# Patient Record
Sex: FEMALE | Race: WHITE | Employment: FULL TIME | URBAN - METROPOLITAN AREA
[De-identification: names, ages, dates, MRNs, and addresses within clinical notes are randomized per-mention and may not be internally consistent; named-entity substitution may affect disease eponyms.]

---

## 2021-09-10 ENCOUNTER — HOSPITAL ENCOUNTER (EMERGENCY)
Age: 31
Discharge: HOME | End: 2021-09-10
Attending: EMERGENCY MEDICINE
Payer: COMMERCIAL

## 2021-09-10 ENCOUNTER — APPOINTMENT (OUTPATIENT)
Dept: GENERAL RADIOLOGY | Age: 31
End: 2021-09-10
Payer: COMMERCIAL

## 2021-09-10 VITALS
TEMPERATURE: 98.6 F | RESPIRATION RATE: 18 BRPM | HEIGHT: 64 IN | OXYGEN SATURATION: 98 % | DIASTOLIC BLOOD PRESSURE: 77 MMHG | SYSTOLIC BLOOD PRESSURE: 122 MMHG | BODY MASS INDEX: 27.31 KG/M2 | WEIGHT: 160 LBS | HEART RATE: 87 BPM

## 2021-09-10 DIAGNOSIS — S52.601A CLOSED FRACTURE OF DISTAL ENDS OF RIGHT RADIUS AND ULNA, INITIAL ENCOUNTER: Primary | ICD-10-CM

## 2021-09-10 DIAGNOSIS — S52.501A CLOSED FRACTURE OF DISTAL ENDS OF RIGHT RADIUS AND ULNA, INITIAL ENCOUNTER: Primary | ICD-10-CM

## 2021-09-10 PROCEDURE — 73110 X-RAY EXAM OF WRIST: CPT

## 2021-09-10 PROCEDURE — 6370000000 HC RX 637 (ALT 250 FOR IP): Performed by: PHYSICIAN ASSISTANT

## 2021-09-10 PROCEDURE — 29125 APPL SHORT ARM SPLINT STATIC: CPT

## 2021-09-10 PROCEDURE — 99284 EMERGENCY DEPT VISIT MOD MDM: CPT

## 2021-09-10 RX ORDER — IBUPROFEN 600 MG/1
600 TABLET ORAL ONCE
Status: COMPLETED | OUTPATIENT
Start: 2021-09-10 | End: 2021-09-10

## 2021-09-10 RX ORDER — HYDROCODONE BITARTRATE AND ACETAMINOPHEN 5; 325 MG/1; MG/1
1 TABLET ORAL EVERY 6 HOURS PRN
Qty: 6 TABLET | Refills: 0 | Status: SHIPPED | OUTPATIENT
Start: 2021-09-10 | End: 2021-09-12

## 2021-09-10 RX ORDER — NORETHINDRONE ACETATE AND ETHINYL ESTRADIOL 1MG-20(21)
1 KIT ORAL DAILY
COMMUNITY

## 2021-09-10 RX ADMIN — IBUPROFEN 600 MG: 600 TABLET, FILM COATED ORAL at 16:18

## 2021-09-10 ASSESSMENT — PAIN DESCRIPTION - ORIENTATION: ORIENTATION: RIGHT

## 2021-09-10 ASSESSMENT — PAIN DESCRIPTION - LOCATION: LOCATION: WRIST

## 2021-09-10 ASSESSMENT — PAIN SCALES - GENERAL: PAINLEVEL_OUTOF10: 5

## 2021-09-10 ASSESSMENT — PAIN DESCRIPTION - DESCRIPTORS: DESCRIPTORS: THROBBING

## 2021-09-10 ASSESSMENT — PAIN DESCRIPTION - PROGRESSION: CLINICAL_PROGRESSION: NOT CHANGED

## 2021-09-10 NOTE — ED TRIAGE NOTES
Pt fell and onto right wrist while playing soccer with kids at work.   Pain and mild swelling, limited range of motion to right wrist.

## 2021-09-10 NOTE — ED PROVIDER NOTES
Emergency Department         COMPLAINT       Chief Complaint   Patient presents with    Wrist Injury      PHYSICAL EXAM      ED Triage Vitals [09/10/21 1551]   BP Temp Temp Source Pulse Resp SpO2 Height Weight   122/77 98.6 °F (37 °C) Temporal 87 18 98 % 5' 4\" (1.626 m) 160 lb (72.6 kg)     Constitutional: Alert, oriented x3, nontoxic, answering questions appropriately, acting properly for age, in no acute distress   HEENT: Extraocular muscles intact,   Neck: Trachea midline, no posterior midline neck tenderness palpation  Musculoskeletal: Right upper extremity no pain to the shoulder or elbow. There is tenderness to the dorsum of the wrist with mild swelling. No deformity. Radial ulnar arteries intact and capillary refill less than 2 seconds. Neurologic: Right upper extremity motor and sensory intact. Skin: Warm and dry     Physical Exam  DIAGNOSTIC RESULTS     EKG: All EKG's are interpreted by the Emergency Department Physician who either signs or Co-signs this chart in the absence of a cardiologist.    Not indicated unless otherwise documented above or in the midlevel documentation    LABS:  No results found for this visit on 09/10/21. Not indicated unless otherwise documented above or in the midlevel documentation    RADIOLOGY:   I reviewedthe radiologist interpretations:  XR WRIST RIGHT (MIN 3 VIEWS)   Final Result   Distal radial and ulnar fractures as described. Not indicated unless otherwise documented above or in the midlevel documentation    EMERGENCY DEPARTMENT COURSE:       PERTINENT ATTENDING PHYSICIAN COMMENTS:    Fall on outstretched right wrist.  X-ray shows fracture of the distal radius and ulna. Will place in the splint and have her follow-up with orthopedics. Faculty Attestation    I performed a history and physical examination of the patient and discussed management with the mid level provideer.  I reviewed the mid level provider's note and agree with the

## 2021-09-10 NOTE — ED NOTES
LUIS Mireles at bedside to apply splint to right wrist/forearm     Vitaly Ruggiero RN  09/10/21 6218

## 2021-09-10 NOTE — ED PROVIDER NOTES
[address] [facility]  [address] \[facility] [place] [facility]\"" [address]  Phone: [phone]  Fax: [phone]        Pt Name: Anthony Jeronimo  MRN: [id number]  [place] 1990  Date of evaluation: 9/10/21    CHIEFCOMPLAINT       Chief Complaint   Patient presents with    Wrist Injury       HISTORY OF PRESENT ILLNESS (Location/Symptom, Timing/Onset, Context/Setting, Quality, Duration, Modifying Factors, Severity)      Anthony Jeronimo is a 26 y.o. female with no pertinent PMH who presents to the ED via private auto with right wrist pain. 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 This occurred just prior to arrival.  Denies history of previous fractures. Patient has exacerbation of the pain with movement and improves with rest. Denies any fever, chills, numbness, weakness, paresthesias, rash, abrasions, lacerations, bleeding disorders or use of anticoagulation, pain to the surrounding joints, any other injuries, or any other concerns at this time. PAST MEDICAL / SURGICAL / SOCIAL / FAMILY HISTORY     PMH:  has no past medical history on file. Surgical History:  has no past surgical history on file. Social History:  reports that she has never smoked. She has never used smokeless tobacco. She reports that she does not drink alcohol and does not use drugs. Family History: has no family status information on file. family history is not on file. Psychiatric History: None    Allergies: Patient has no known allergies. Home Medications:   Prior to Admission medications    Medication Sig Start Date End Date Taking?  Authorizing Provider   norethindrone-ethinyl estradiol (JUNEL FE 1/20) 1-20 MG-MCG per tablet Take 1 tablet by mouth daily   Yes Historical Provider, MD   HYDROcodone-acetaminophen (Ruby Lock) 5-325 MG per tablet Take 1 tablet by mouth every 6 hours as needed for Pain for up to 2 days. Intended supply: 3 days. Take lowest dose possible to manage pain 9/10/21 9/12/21 Yes Aline Nelson PA-C       REVIEW OF SYSTEMS  (2-9 systems for level 4, 10 ormore for level 5)      Review of Systems    See HPI. PHYSICAL EXAM  (up to 7 for level 4, 8 or more for level 5)      INITIAL VITALS:  height is 5' 4\" (1.626 m) and weight is 72.6 kg (160 lb). Her temporal temperature is 98.6 °F (37 °C). Her blood pressure is 122/77 and her pulse is 87. Her respiration is 18 and oxygen saturation is 98%. Vital signs reviewed. Physical Exam    General:  Alert, cooperative, well-groomed, well-nourished, appears stated age, and is in no acute distress. Head:  Normocephalic, atraumatic, and without obvious abnormality. Eyes:  Sclerae/conjunctivae clear without injection, pallor, or icterus. Corneas clear without opacities. EOM's intact. ENT: Ears and nose are all without obvious masses lesion or deformity. No oropharynx examination performed due to aerosolization risk during COVID-19 pandemic. Neck: Supple and symmetrical. Trachea midline. No adenopathy. Musculoskeletal: The right hand/wrist appears edematous and developing ecchymosis over the radial aspect and is diffusely tender to palpation along both the radial and ulnar aspect of the wrist.  No erythema, warmth, abrasions, or lacerations to the area. No snuffbox tenderness. Full ROM of the digits.  strength decreased secondary to pain. Sensation intact to light touch. The shoulder and elbow joints are normal in appearance with full ROM and 5/5 strength. Radial pulses 2+ and symmetrical. Cap refill <2 seconds. Extremities: Warm and dry without erythema or edema. No venous stasis changes. Skin: Soft, good turgor, and well-hydrated. No obvious rashes or lesions. Neurologic: GCS is 15 and no focal deficits are appreciated. Normal gait.  Grossly normal motor and sensation. Speech clear. Psychiatric: Normal mood and affect. Normal behavior. Coherent thought process. DIFFERENTIAL DIAGNOSIS / MDM     The patient presented to the ED with right wrist pain with a mechanism suggestive of a fracture versus sprain/strain. Vital signs are stable. The shoulder and elbow joints were not affected. There are no lesions, lacerations, or signs of compartment syndrome. Motor is decreased secondary to pain. Pulses are 2+ and sensation is intact. Will obtain an XR and give ice and ibuprofen for pain. PLAN (LABS / IMAGING / EKG):  Orders Placed This Encounter   Procedures    XR WRIST RIGHT (MIN 3 VIEWS)       MEDICATIONS ORDERED:  Orders Placed This Encounter   Medications    ibuprofen (ADVIL;MOTRIN) tablet 600 mg    HYDROcodone-acetaminophen (NORCO) 5-325 MG per tablet     Sig: Take 1 tablet by mouth every 6 hours as needed for Pain for up to 2 days. Intended supply: 3 days. Take lowest dose possible to manage pain     Dispense:  6 tablet     Refill:  0       Controlled Substances Monitoring:     DIAGNOSTIC RESULTS     RADIOLOGY: All images are read by the radiologist and their interpretations are reviewed. XR WRIST RIGHT (MIN 3 VIEWS)    Result Date: 9/10/2021  EXAMINATION: 3 XRAY VIEWS OF THE RIGHT WRIST 9/10/2021 3:57 pm COMPARISON: None. HISTORY: ORDERING SYSTEM PROVIDED HISTORY: pain Reason for Exam: right wrist pain Acuity: Acute Type of Exam: Initial Mechanism of Injury: fall at work FINDINGS: Comminuted impacted distal radial fracture. Mildly displaced ulnar styloid fracture. No dislocation. Joint spaces are well preserved. Mild soft tissue swelling. Distal radial and ulnar fractures as described. LABS:  No results found for this visit on 09/10/21.     EMERGENCY DEPARTMENT COURSE           Vitals:    Vitals:    09/10/21 1551   BP: 122/77   Pulse: 87   Resp: 18   Temp: 98.6 °F (37 °C)   TempSrc: Temporal   SpO2: 98%   Weight: 72.6 kg (160 lb)   Height: 5' 4\" (1.626 m)     -------------------------  BP: 122/77, Temp: 98.6 °F (37 °C), Pulse: 87, Resp: 18      RE-EVALUATION:  X-ray positive for fractures of the distal radius and ulna. Patient updated regarding these results and was placed in a splint as per the procedure note below. She was scheduled for an orthopedic appointment and advise supportive care instructions. The patient and/or family and I have discussed the diagnosis and risks, and we agree with discharging home to follow-up with their PCP and/or pertinent providers. The patient appears stable for discharge and has been instructed to return immediately for new concerning symptoms like fever, increased pain, weakness, numbness, color change, or coldness to an extremity or if the current symptoms worsen in any way. The patient understands that at this time there is no evidence for a more malignant underlying process, but the patient also understands that early in the process of an illness or injury, an emergency department workup can be falsely reassuring. Routine discharge counseling was given, and the patient understands that worsening, changing or persistent symptoms should prompt an immediate call or follow up with their primary physician or return to the emergency department. I have reviewed the disposition diagnosis with the patient and or their family/guardian. I have answered their questions and given discharge instructions. They voiced understanding of these instructions and did not have any further questions or complaints. This patient was seen by the attending physician and they agreed with the assessment and plan. CONSULTS:  None    PROCEDURES:    PROCEDURE NOTE - SPLINT APPLICATION    CONSENT: The patient provided verbal consent for this procedure. PROCEDURE:  The pre-splint application exam showed distal perfusion & neurologic function to be normal. The patient was placed in the sitting position. Gauze applied prior. Orthoglass splint material used to form a sugar tong splint applied with Ace wrap used to secure. A post-splinting exam revealed good alignment and distal perfusion & neurologic function to be normal.     The patient tolerated the procedure well. COMPLICATIONS:  None     Aline Nelson PA-C    FINAL IMPRESSION      1. Closed fracture of distal ends of right radius and ulna, initial encounter          DISPOSITION / PLAN     CONDITION ON DISPOSITION:   Good / Stable for discharge. PATIENT REFERRED TO:  [facility]  [address] [address] [place] [address]  [phone]  Go in 3 days  As scheduled      DISCHARGE MEDICATIONS:  Discharge Medication List as of 9/10/2021  4:40 PM      START taking these medications    Details   HYDROcodone-acetaminophen (NORCO) 5-325 MG per tablet Take 1 tablet by mouth every 6 hours as needed for Pain for up to 2 days. Intended supply: 3 days.  Take lowest dose possible to manage pain, Disp-6 tablet, R-0Normal             Sudhir Tapia PA-C   Emergency Medicine Physician Assistant    (Please note that portions of this note were completed with a voice recognition program.  Efforts were made to edit the dictations but occasionally words aremis-transcribed.)        Sudhir Tapia PA-C  09/10/21 6732

## 2021-09-13 ENCOUNTER — OFFICE VISIT (OUTPATIENT)
Dept: ORTHOPEDIC SURGERY | Age: 31
End: 2021-09-13
Payer: COMMERCIAL

## 2021-09-13 VITALS — HEIGHT: 64 IN | BODY MASS INDEX: 27.31 KG/M2 | WEIGHT: 160 LBS | RESPIRATION RATE: 12 BRPM

## 2021-09-13 DIAGNOSIS — S52.531A CLOSED COLLES' FRACTURE OF RIGHT RADIUS, INITIAL ENCOUNTER: Primary | ICD-10-CM

## 2021-09-13 DIAGNOSIS — S52.614A CLOSED NONDISPLACED FRACTURE OF STYLOID PROCESS OF RIGHT ULNA, INITIAL ENCOUNTER: ICD-10-CM

## 2021-09-13 PROCEDURE — 25600 CLTX DST RDL FX/EPHYS SEP WO: CPT | Performed by: PHYSICIAN ASSISTANT

## 2021-09-13 PROCEDURE — 99204 OFFICE O/P NEW MOD 45 MIN: CPT | Performed by: PHYSICIAN ASSISTANT

## 2021-09-13 NOTE — PROGRESS NOTES
[address] [facility], [address] [place], [address] [facility], [address] [facility]           Dept Phone: [phone]           Dept Fax:  [address] [facility] [address] [facility]           [place]          Dept Phone: [phone]           Dept Fax:  [phone]    Chief Compliant:  Chief Complaint   Patient presents with    Wrist Injury     right, DOI- 9/10/21        Subjective:       Socrates Strickland is a 26 y.o. right hand-dominant female here for evaluation and treatment of a right wrist injury. The injury occurred on 9/10/2021. Mechanism of injury was: [address] injury at work . Since that time the patient has been experiencing right wrist  pain and swelling. The pain is currently rated mild. The patient was originally seen at local emergency room where an x-ray was done, a fracture of the wrist was identified and the patient was placed in a sugar splint. The patient was subsequently referred to Orthopedics for further management. The splint has remained in place and is clean and dry. Patient denies any radiation of pain up the forearm or elbow. She denies any numbness or tingling. Outside reports reviewed: ER records   Patient's medications, allergies, past medical, surgical, social and family histories were reviewed and updated as appropriate. Review of Systems  Review of Systems   Constitutional: Negative for fever, chills, sweats, recent illness, or recent injury. Neurological: Negative for headaches, numbness, or weakness. Integumentary: Negative for rash, itching, ecchymosis, abrasions, or laceration. Musculoskeletal: Positive for Wrist Injury (right, DOI- 9/10/21)        Objective:   Physical Exam:  Constitutional: Patient is oriented to person, place, and time. Patient appears well-developed and well nourished.    Musculoskeletal:       General: alert, appears stated age and cooperative   Gait:    Normal   Right Wrist  Circulation:   warm, well perfused, brisk capillary refill distal to the injury   Skin:   ecchymosis   Swelling:  pain is perceived as mild (1-3  pain scale), mildswelling was present in the hand   Deformity:  There is not an obvious deformity of the hand. Finger ROM:   normal   Wrist ROM:  wrist flexion and extension was not assessed today secondary to pain   Sensation:   intact to light touch   Tenderness:    Point tenderness to the ulnar styloid and distal radius fracture site. No tenderness in the anatomic snuffbox or the scaphoid tubercle. Neurological: Patient is alert and oriented to person, place, and time. Normal strenght. No sensory deficit. Skin: Skin is warm and dry  Psychiatric: Behavior is normal. Thought content normal.  Nursing note and vitals reviewed. Imaging  Labs and Imaging:     XR taken today:  No results found. Previous Imaging:  X-rays from 9/10/2021 are independently reviewed by me Demonstrating an impacted/mildly comminuted distal radius fracture that is minimally displaced. Nondisplaced ulnar styloid avulsion fracture noted as well. X-rays taken in clinic and preliminarily reviewed by me:  Views of the right wrist taken after casting demonstrate overlying casting material.  Again demonstrate a minimally displaced distal radius fracture that actually does appear to have better alignment on lateral view compared with x-rays on 9/10/2021. Ulnar styloid fracture is again noted with no interval displacement. Assessment:     1. Closed Colles' fracture of right radius, initial encounter    2. Closed nondisplaced fracture of styloid process of right ulna, initial encounter               Plan: This is a 26 y.o. female who presents to the clinic today for evaluation of minimally displaced comminuted distal radius fracture of the dominant right hand.     1. I discussed the entity of distal radius fractures with the patient. I fully outlined the anatomy regarding the wrist.  All of her questions were answered fully. 2. At this point cast immobilization will be necessary for treatment of the fracture given the acceptable alignment. A short arm cast was applied and molded into position. The patient was instructed on proper cast care and the need to keep it clean and dry. 3. The patient was encouraged to keep her arm elevated and iced for the next 24-48 hours.   4. Follow up will be in 2 weeks for cast check and reevaluation with wrist x-rays in cast.

## 2021-09-27 ENCOUNTER — OFFICE VISIT (OUTPATIENT)
Dept: ORTHOPEDIC SURGERY | Age: 31
End: 2021-09-27

## 2021-09-27 DIAGNOSIS — S52.531A CLOSED COLLES' FRACTURE OF RIGHT RADIUS, INITIAL ENCOUNTER: Primary | ICD-10-CM

## 2021-09-27 PROCEDURE — 99024 POSTOP FOLLOW-UP VISIT: CPT | Performed by: PHYSICIAN ASSISTANT

## 2021-09-27 NOTE — PROGRESS NOTES
Subjective:       Mera Camacho is a 26 y.o. right hand-dominant female who returns for follow-up of a right distal radius The injury occurred 9/10/2021. Patient was seen by myself on 9/13/2021 and placed in a short arm cast at that time. She reports no problems with the cast or injury, and no problems with swelling at this time, numbness, or tingling in her right hand or wrist.  She reports initially the cast felt quite tight due to the swelling but it feels much better at this time she denies any pain today. Patient's medications, allergies, past medical, surgical, social and family histories were reviewed and updated as appropriate. Objective:      General:   alert, appears stated age and cooperative   Gait:    Normal   Right Wrist  Cast Condition:  good   Circulation:   warm, well perfused, brisk capillary refill distal to the injury   Skin:   No evidence of erythema, ecchymosis, abrasions or lacerations to the cast edges over the digits   Swelling:  absent   Deformity:  Cast is in place    Finger ROM:   normal   Sensation:   intact to light touch   Tenderness:    No tenderness to the digits distally or forearm/elbow proximally. Cast remains intact. Imaging  X-rays taken in clinic today on 9/27/2021 and preliminarily reviewed by me demonstrating 3 views of the right wrist with overlying casting material.  Minimally displaced distal radius fracture. No significant interval displacement well within acceptable limits. Assessment:     Encounter Diagnosis   Name Primary?  Closed Colles' fracture of right radius, initial encounter Yes        Plan:    Mera Camacho is a 26 y.o. right hand-dominant female who returns for follow-up of a right distal radius The injury occurred 9/10/2021. Patient has been in short arm cast since initial evaluation by me on 9/13/2021. 1.  X-rays today demonstrate acceptable alignment with no significant interval change.   Cast appears to be fitting appropriately, swelling has improved significantly but there is no evidence of cast looseness on evaluation today. 2.  We will continue cast for 2 more weeks  3.   Follow-up in 2 weeks for reevaluation with repeat x-rays out of cast however patient may call or return sooner for any questions or concerns

## 2021-10-11 ENCOUNTER — OFFICE VISIT (OUTPATIENT)
Dept: ORTHOPEDIC SURGERY | Age: 31
End: 2021-10-11

## 2021-10-11 VITALS — WEIGHT: 160 LBS | BODY MASS INDEX: 27.31 KG/M2 | RESPIRATION RATE: 12 BRPM | HEIGHT: 64 IN

## 2021-10-11 DIAGNOSIS — S52.531A CLOSED COLLES' FRACTURE OF RIGHT RADIUS, INITIAL ENCOUNTER: Primary | ICD-10-CM

## 2021-10-11 PROCEDURE — 99024 POSTOP FOLLOW-UP VISIT: CPT | Performed by: PHYSICIAN ASSISTANT

## 2021-10-11 NOTE — PATIENT INSTRUCTIONS
Patient Education        Wrist Fracture: Rehab Exercises  Introduction  Here are some examples of exercises for you to try. The exercises may be suggested for a condition or for rehabilitation. Start each exercise slowly. Ease off the exercises if you start to have pain. You will be told when to start these exercises and which ones will work best for you. How to do the exercises  Wrist flexion and extension    1. Place your forearm on a table, with your hand and affected wrist extended beyond the table, palm down. 2. Bend your wrist to move your hand upward and allow your hand to close into a fist, then lower your hand and allow your fingers to relax. Hold each position for about 6 seconds. 3. Repeat 8 to 12 times. Hand flips    1. While seated, place your forearm and affected wrist on your thigh, palm down. 2. Flip your hand over so the back of your hand rests on your thigh and your palm is up. Alternate between palm up and palm down while keeping your forearm on your thigh. 3. Repeat 8 to 12 times. Wrist radial and ulnar deviation    1. Hold your affected hand out in front of you, palm down. 2. Slowly bend your wrist as far as you can from side to side. Hold each position for about 6 seconds. 3. Repeat 8 to 12 times. Wrist extensor stretch    1. Extend the arm with the affected wrist in front of you and point your fingers toward the floor. 2. With your other hand, gently bend your wrist farther until you feel a mild to moderate stretch in your forearm. 3. Hold the stretch for at least 15 to 30 seconds. 4. Repeat 2 to 4 times. 5. When you can do this stretch with ease and no pain, repeat steps 1 through 4. But this time extend your affected arm in front of you and make a fist with your palm facing down. Then bend your wrist, pointing your fist toward the floor. Wrist flexor stretch    1. Extend the arm with the affected wrist in front of you with your palm facing away from your body.   2. Bend back your wrist, pointing your hand up toward the ceiling. 3. With your other hand, gently bend your wrist farther until you feel a mild to moderate stretch in your forearm. 4. Hold the stretch for at least 15 to 30 seconds. 5. Repeat 2 to 4 times. 6. Repeat steps 1 through 5, but this time extend your affected arm in front of you with your palm facing up. Then bend back your wrist, pointing your hand toward the floor. Intrinsic flexion    1. Rest the hand with the affected wrist on a table and bend the large joints where your fingers connect to your hand. Keep your thumb and the other joints in your fingers straight. 2. Slowly straighten your fingers. Your wrist should be relaxed, following the line of your fingers and thumb. 3. Move back to your starting position, with your hand bent. 4. Repeat 8 to 12 times. MP extension    1. Place your good hand on a table, palm up. Put the hand with the affected wrist on top of your good hand with your fingers wrapped around the thumb of your good hand like you are making a fist.  2. Slowly uncurl the joints of the hand with the affected wrist where your fingers connect to your hand so that only the top two joints of your fingers are bent. Your fingers will look like a hook. Hold the position for about 6 seconds. 3. Move back to your starting position, with your fingers wrapped around your good thumb. 4. Repeat 8 to 12 times. Follow-up care is a key part of your treatment and safety. Be sure to make and go to all appointments, and call your doctor if you are having problems. It's also a good idea to know your test results and keep a list of the medicines you take. Where can you learn more? Go to https://[employer]. org and sign in to your [employer] account. Enter N605 in the [employer] box to learn more about \"Wrist Fracture: Rehab Exercises. \"     If you do not have an account, please click on the \"Sign Up Now\" link.   Current as of: July 1, 2021               Content Version: 13.0  © 9314-2857 [employer]wise, Inc[employer]. Care instructions adapted under license by [facility] ([facility]). If you have questions about a medical condition or this instruction, always ask your healthcare professional. [address] any warranty or liability for your use of this information.

## 2021-10-11 NOTE — PROGRESS NOTES
Subjective:       Selvin Wheeler is a 26 y.o. right hand-dominant female who returns for follow-up of a right distal radius The injury occurred 9/10/2021. Patient was seen by myself on 9/13/2021 and placed in a short arm cast at that time. She reports no problems with the cast or injury, and no problems with swelling at this time, numbness, or tingling in her right hand or wrist.      She did have repeat x-rays in cast on 9/27/2021 which demonstrated acceptable alignment. Patient instructed follow-up today for reevaluation. Patient reports no pain today and is doing very well. Upon cast removal she is notes some stiffness but continues to be pain-free. No other acute complaints at this time. Patient's medications, allergies, past medical, surgical, social and family histories were reviewed and updated as appropriate. Objective:      General:   alert, appears stated age and cooperative   Gait:    Normal   Right Wrist  Cast Condition:  good   Circulation:   warm, well perfused, brisk capillary refill distal to the injury   Skin:   No evidence of erythema, ecchymosis, abrasions or lacerations    Swelling:  absent   Deformity:  No evidence of deformity of the wrist   Finger ROM:   normal   Sensation:   intact to light touch   Tenderness:    No tenderness to the distal radius fracture site. No tenderness to the DRUJ, ulnar styloid, anatomic snuffbox, scaphoid tubercle, metacarpals or phalanges. A P  Wrist ROM: Flexion:   40 45            Ext: 30 35    UD: 20    RD:  15    Imaging  X-rays taken in clinic today on on 10/11/2021 and preliminarily reviewed by me compared with those on 9/27/2021. Distal radius fracture well within acceptable limits. Evidence of bony callus present. Interval improvement of alignment. Again noted is a minimally displaced ulnar styloid avulsion fracture with early evidence of healing. Assessment:     Encounter Diagnosis   Name Primary?     Closed Colles' fracture of right radius, initial encounter Yes        Plan:    Rosanna Connell is a 26 y.o. right hand-dominant female who returns for follow-up of a right distal radius The injury occurred 9/10/2021. Patient has been in short arm cast since initial evaluation by me on 9/13/2021. Cast is removed today. Patient does have some stiffness but very little pain on examination today. 1.  Patient demonstrating clinical union radiographic evidence of healing with no tenderness on exam.  2.  We will discontinue cast today and transition patient to a cock-up wrist splint which she is fitted for at this time. 3.  Patient is educated I would like her to come out of the brace 3 times a day for wrist range of motion exercises. She may also work on  strengthening when in the brace.   4.  We will see patient back in 2 weeks for reevaluation with repeat x-rays of the wrist however patient may call return sooner for any questions or concerns

## 2021-10-29 ENCOUNTER — OFFICE VISIT (OUTPATIENT)
Dept: ORTHOPEDIC SURGERY | Age: 31
End: 2021-10-29

## 2021-10-29 VITALS — BODY MASS INDEX: 27.31 KG/M2 | RESPIRATION RATE: 12 BRPM | HEIGHT: 64 IN | WEIGHT: 160 LBS

## 2021-10-29 DIAGNOSIS — S52.614A CLOSED NONDISPLACED FRACTURE OF STYLOID PROCESS OF RIGHT ULNA, INITIAL ENCOUNTER: Primary | ICD-10-CM

## 2021-10-29 PROCEDURE — 99024 POSTOP FOLLOW-UP VISIT: CPT | Performed by: PHYSICIAN ASSISTANT

## 2021-10-29 NOTE — PROGRESS NOTES
Subjective:       Hill Rodrigues is a 33 y.o. right hand-dominant female who returns for follow-up of a right distal radius The injury occurred 9/10/2021. Patient was seen by myself on 9/13/2021 and placed in a short arm cast at that time. Cast was removed at last visit on 10/11/2021 the patient was placed in a cock-up wrist splint. She was instructed to gradually increase activity as tolerated over the last 2 weeks. Patient returns today stating she is pain-free. She has regained a lot of her range of motion. Patient reports in fact she has been out of the brace for approximately 1 week and is doing very well. She notes the occasional temporary pain if she overdoes it but otherwise is doing very well. Patient's medications, allergies, past medical, surgical, social and family histories were reviewed and updated as appropriate. Objective:      General:   alert, appears stated age and cooperative   Gait:    Normal   Right Wrist  Cast Condition:  good   Circulation:   warm, well perfused, brisk capillary refill distal to the injury   Skin:   No evidence of erythema, ecchymosis, abrasions or lacerations    Swelling:  absent   Deformity:  No evidence of deformity of the wrist   Finger ROM:   normal   Sensation:   intact to light touch   Tenderness:    No tenderness to the distal radius fracture site. No tenderness to the DRUJ, ulnar styloid, anatomic snuffbox, scaphoid tubercle, metacarpals or phalanges. A P  Wrist ROM: Flexion:   70 85            Ext: 50 60    UD: 25    RD:  15    Imaging  X-rays taken in clinic today on on 10/29/2021 and preliminarily reviewed by me compared with those on 10/11/2021. Distal radius fracture well within acceptable limits. Evidence of bony callus present. Interval improvement of alignment. Again noted is a minimally displaced ulnar styloid avulsion fracture with early evidence of healing. Assessment:     Encounter Diagnosis   Name Primary?     Closed nondisplaced fracture of styloid process of right ulna, initial encounter Yes        Plan:    Araceli Blevins is a 26 y.o. right hand-dominant female who returns for follow-up of a right distal radius The injury occurred 9/10/2021. Cast was placed on 9/13/2021 and removed on 10/11/2021.  1.  Patient demonstrating clinical union radiographic evidence of healing with no tenderness on exam.  2.  Patient did very well with a cock-up wrist brace. She has been out of this but on her own accord for the last week and is doing very well. 3.  She demonstrates significant provement in range of motion is overall pain-free. 4.  We will see patient back on as needed basis gradual advance activity as tolerated.

## 2021-11-04 ENCOUNTER — TELEPHONE (OUTPATIENT)
Dept: ORTHOPEDIC SURGERY | Age: 31
End: 2021-11-04

## 2021-11-04 NOTE — TELEPHONE ENCOUNTER
Hui De Los Santos from [facility] wants the apt notes from 10/29/21 be faxed to:  Fax: [phone]  Ph: [phone]